# Patient Record
Sex: FEMALE | Race: WHITE | Employment: OTHER | URBAN - NONMETROPOLITAN AREA
[De-identification: names, ages, dates, MRNs, and addresses within clinical notes are randomized per-mention and may not be internally consistent; named-entity substitution may affect disease eponyms.]

---

## 2017-11-03 ENCOUNTER — HOSPITAL ENCOUNTER (OUTPATIENT)
Age: 82
Discharge: HOME | End: 2017-11-03
Payer: MEDICARE

## 2017-11-03 LAB
ABSOLUTE EOS #: 0.08 K/UL (ref 0–0.4)
ABSOLUTE IMMATURE GRANULOCYTE: NORMAL K/UL (ref 0–0.3)
ABSOLUTE LYMPH #: 1.88 K/UL (ref 1–4.8)
ABSOLUTE MONO #: 0.53 K/UL (ref 0–1)
ANION GAP SERPL CALCULATED.3IONS-SCNC: 12 MMOL/L (ref 9–17)
ATYPICAL LYMPHOCYTE ABSOLUTE COUNT: 0.15 K/UL (ref 0–1)
ATYPICAL LYMPHOCYTES: 2 %
BASOPHILS # BLD: NORMAL %
BASOPHILS ABSOLUTE: NORMAL K/UL (ref 0–0.2)
BUN BLDV-MCNC: 23 MG/DL (ref 8–23)
BUN/CREAT BLD: 17 (ref 9–20)
CALCIUM SERPL-MCNC: 9.1 MG/DL (ref 8.6–10.4)
CHLORIDE BLD-SCNC: 105 MMOL/L (ref 98–107)
CO2: 24 MMOL/L (ref 20–31)
CREAT SERPL-MCNC: 1.32 MG/DL (ref 0.5–0.9)
DIFFERENTIAL TYPE: NORMAL
EOSINOPHILS RELATIVE PERCENT: 1 %
GFR AFRICAN AMERICAN: 46 ML/MIN
GFR NON-AFRICAN AMERICAN: 38 ML/MIN
GFR SERPL CREATININE-BSD FRML MDRD: ABNORMAL ML/MIN/{1.73_M2}
GFR SERPL CREATININE-BSD FRML MDRD: ABNORMAL ML/MIN/{1.73_M2}
GLUCOSE BLD-MCNC: 121 MG/DL (ref 70–99)
HCT VFR BLD CALC: 42.1 % (ref 36–46)
HEMOGLOBIN: 13.6 G/DL (ref 12–16)
IMMATURE GRANULOCYTES: NORMAL %
LYMPHOCYTES # BLD: 25 %
MCH RBC QN AUTO: 30.2 PG (ref 26–34)
MCHC RBC AUTO-ENTMCNC: 32.3 G/DL (ref 31–37)
MCV RBC AUTO: 93.4 FL (ref 80–100)
MONOCYTES # BLD: 7 %
MORPHOLOGY: NORMAL
PDW BLD-RTO: 14.8 % (ref 12.1–15.2)
PLATELET # BLD: 210 K/UL (ref 140–450)
PLATELET ESTIMATE: NORMAL
PMV BLD AUTO: NORMAL FL (ref 6–12)
POTASSIUM SERPL-SCNC: 4.3 MMOL/L (ref 3.7–5.3)
RBC # BLD: 4.5 M/UL (ref 4–5.2)
RBC # BLD: NORMAL 10*6/UL
SEG NEUTROPHILS: 65 %
SEGMENTED NEUTROPHILS ABSOLUTE COUNT: 4.86 K/UL (ref 2.5–7)
SODIUM BLD-SCNC: 141 MMOL/L (ref 135–144)
WBC # BLD: 7.5 K/UL (ref 3.5–11)
WBC # BLD: NORMAL 10*3/UL

## 2017-11-03 PROCEDURE — 80048 BASIC METABOLIC PNL TOTAL CA: CPT

## 2017-11-03 PROCEDURE — 36415 COLL VENOUS BLD VENIPUNCTURE: CPT

## 2017-11-03 PROCEDURE — 85025 COMPLETE CBC W/AUTO DIFF WBC: CPT

## 2017-11-09 ENCOUNTER — HOSPITAL ENCOUNTER (OUTPATIENT)
Dept: NON INVASIVE DIAGNOSTICS | Age: 82
Discharge: HOME | End: 2017-11-09
Payer: MEDICARE

## 2017-11-09 PROCEDURE — 93225 XTRNL ECG REC<48 HRS REC: CPT

## 2017-11-09 PROCEDURE — 93227 XTRNL ECG REC<48 HR R&I: CPT | Performed by: INTERNAL MEDICINE

## 2017-11-09 NOTE — PROGRESS NOTES
The patient was educated on the use of a holter monitor. The patient's comprehension was high. The patient was able to verbalize recall. The patient was instructed on how and when to return the monitor. RT educated pt's son of instructions as well at request of pt.     Attila Stewart RCP   11/09/17  9:46 AM

## 2017-11-22 ENCOUNTER — HOSPITAL ENCOUNTER (OUTPATIENT)
Dept: VASCULAR LAB | Age: 82
Discharge: HOME | End: 2017-11-22
Payer: MEDICARE

## 2017-11-22 ENCOUNTER — HOSPITAL ENCOUNTER (OUTPATIENT)
Dept: CT IMAGING | Age: 82
Discharge: HOME | End: 2017-11-22
Payer: MEDICARE

## 2017-11-22 DIAGNOSIS — R27.0 ATAXIA: ICD-10-CM

## 2017-11-22 DIAGNOSIS — R26.81 UNSTEADY GAIT: ICD-10-CM

## 2017-11-22 PROCEDURE — 93880 EXTRACRANIAL BILAT STUDY: CPT

## 2017-11-22 PROCEDURE — 70450 CT HEAD/BRAIN W/O DYE: CPT

## 2018-11-01 ENCOUNTER — HOSPITAL ENCOUNTER (OUTPATIENT)
Dept: MAMMOGRAPHY | Age: 83
Discharge: HOME | End: 2018-11-03
Payer: MEDICARE

## 2018-11-01 ENCOUNTER — HOSPITAL ENCOUNTER (OUTPATIENT)
Age: 83
Discharge: HOME | End: 2018-11-01
Payer: MEDICARE

## 2018-11-01 ENCOUNTER — HOSPITAL ENCOUNTER (OUTPATIENT)
Dept: CT IMAGING | Age: 83
Discharge: HOME | End: 2018-11-03
Payer: MEDICARE

## 2018-11-01 DIAGNOSIS — R63.0 DECREASED APPETITE: ICD-10-CM

## 2018-11-01 DIAGNOSIS — Z12.31 SCREENING MAMMOGRAM, ENCOUNTER FOR: ICD-10-CM

## 2018-11-01 LAB
ABSOLUTE EOS #: 0.12 K/UL (ref 0–0.4)
ABSOLUTE IMMATURE GRANULOCYTE: NORMAL K/UL (ref 0–0.3)
ABSOLUTE LYMPH #: 1.44 K/UL (ref 1–4.8)
ABSOLUTE MONO #: 0.42 K/UL (ref 0–1)
ALBUMIN SERPL-MCNC: 4.1 G/DL (ref 3.5–5.2)
ALBUMIN/GLOBULIN RATIO: ABNORMAL (ref 1–2.5)
ALP BLD-CCNC: 59 U/L (ref 35–104)
ALT SERPL-CCNC: 11 U/L (ref 5–33)
ANION GAP SERPL CALCULATED.3IONS-SCNC: 9 MMOL/L (ref 9–17)
AST SERPL-CCNC: 17 U/L
BASOPHILS # BLD: NORMAL % (ref 0–2)
BASOPHILS ABSOLUTE: NORMAL K/UL (ref 0–0.2)
BILIRUB SERPL-MCNC: 0.55 MG/DL (ref 0.3–1.2)
BUN BLDV-MCNC: 35 MG/DL (ref 8–23)
BUN/CREAT BLD: 22 (ref 9–20)
CA 125: 12 U/ML
CA 19-9: 1 U/ML (ref 0–35)
CALCIUM SERPL-MCNC: 9.3 MG/DL (ref 8.6–10.4)
CARCINOEMBRYONIC ANTIGEN: 3.2 NG/ML
CHLORIDE BLD-SCNC: 106 MMOL/L (ref 98–107)
CO2: 27 MMOL/L (ref 20–31)
CREAT SERPL-MCNC: 1.6 MG/DL (ref 0.5–0.9)
DIFFERENTIAL TYPE: NORMAL
EOSINOPHILS RELATIVE PERCENT: 2 % (ref 0–5)
GFR AFRICAN AMERICAN: 37 ML/MIN
GFR NON-AFRICAN AMERICAN: 30 ML/MIN
GFR SERPL CREATININE-BSD FRML MDRD: ABNORMAL ML/MIN/{1.73_M2}
GFR SERPL CREATININE-BSD FRML MDRD: ABNORMAL ML/MIN/{1.73_M2}
GLUCOSE BLD-MCNC: 102 MG/DL (ref 70–99)
HCT VFR BLD CALC: 41.9 % (ref 36–46)
HEMOGLOBIN: 13.8 G/DL (ref 12–16)
IMMATURE GRANULOCYTES: NORMAL %
LYMPHOCYTES # BLD: 24 % (ref 15–40)
MCH RBC QN AUTO: 30.7 PG (ref 26–34)
MCHC RBC AUTO-ENTMCNC: 32.9 G/DL (ref 31–37)
MCV RBC AUTO: 93.4 FL (ref 80–100)
MONOCYTES # BLD: 7 % (ref 4–8)
MORPHOLOGY: NORMAL
NRBC AUTOMATED: NORMAL PER 100 WBC
PDW BLD-RTO: 13.6 % (ref 12.1–15.2)
PLATELET # BLD: 169 K/UL (ref 140–450)
PLATELET ESTIMATE: NORMAL
PMV BLD AUTO: NORMAL FL (ref 6–12)
POTASSIUM SERPL-SCNC: 4.5 MMOL/L (ref 3.7–5.3)
RBC # BLD: 4.49 M/UL (ref 4–5.2)
RBC # BLD: NORMAL 10*6/UL
SEG NEUTROPHILS: 67 % (ref 47–75)
SEGMENTED NEUTROPHILS ABSOLUTE COUNT: 4.02 K/UL (ref 2.5–7)
SODIUM BLD-SCNC: 142 MMOL/L (ref 135–144)
TOTAL PROTEIN: 7.2 G/DL (ref 6.4–8.3)
TSH SERPL DL<=0.05 MIU/L-ACNC: 2.86 MIU/L (ref 0.3–5)
VITAMIN B-12: 405 PG/ML (ref 232–1245)
WBC # BLD: 6 K/UL (ref 3.5–11)
WBC # BLD: NORMAL 10*3/UL

## 2018-11-01 PROCEDURE — 77067 SCR MAMMO BI INCL CAD: CPT

## 2018-11-01 PROCEDURE — 80053 COMPREHEN METABOLIC PANEL: CPT

## 2018-11-01 PROCEDURE — 74176 CT ABD & PELVIS W/O CONTRAST: CPT

## 2018-11-01 PROCEDURE — 6360000004 HC RX CONTRAST MEDICATION: Performed by: SURGERY

## 2018-11-01 PROCEDURE — 86304 IMMUNOASSAY TUMOR CA 125: CPT

## 2018-11-01 PROCEDURE — 86301 IMMUNOASSAY TUMOR CA 19-9: CPT

## 2018-11-01 PROCEDURE — 82607 VITAMIN B-12: CPT

## 2018-11-01 PROCEDURE — 82378 CARCINOEMBRYONIC ANTIGEN: CPT

## 2018-11-01 PROCEDURE — 36415 COLL VENOUS BLD VENIPUNCTURE: CPT

## 2018-11-01 PROCEDURE — 85025 COMPLETE CBC W/AUTO DIFF WBC: CPT

## 2018-11-01 PROCEDURE — 84443 ASSAY THYROID STIM HORMONE: CPT

## 2018-11-01 RX ADMIN — IOHEXOL 25 ML: 240 INJECTION, SOLUTION INTRATHECAL; INTRAVASCULAR; INTRAVENOUS; ORAL at 13:16

## 2019-08-29 ENCOUNTER — HOSPITAL ENCOUNTER (OUTPATIENT)
Dept: CT IMAGING | Age: 84
Discharge: HOME | End: 2019-08-31
Payer: MEDICARE

## 2019-08-29 DIAGNOSIS — M25.551 HIP PAIN, ACUTE, RIGHT: ICD-10-CM
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
## 2019-08-29 PROCEDURE — 72192 CT PELVIS W/O DYE: CPT

## 2020-11-28 ENCOUNTER — HOSPITAL ENCOUNTER (EMERGENCY)
Age: 85
Discharge: HOME | End: 2020-11-28
Attending: FAMILY MEDICINE
Payer: MEDICARE

## 2020-11-28 ENCOUNTER — APPOINTMENT (OUTPATIENT)
Dept: GENERAL RADIOLOGY | Age: 85
End: 2020-11-28
Payer: MEDICARE

## 2020-11-28 VITALS
HEART RATE: 82 BPM | SYSTOLIC BLOOD PRESSURE: 112 MMHG | DIASTOLIC BLOOD PRESSURE: 76 MMHG | TEMPERATURE: 97.7 F | RESPIRATION RATE: 20 BRPM | OXYGEN SATURATION: 93 % | WEIGHT: 142.7 LBS

## 2020-11-28 LAB
-: ABNORMAL
ABSOLUTE EOS #: 0 K/UL (ref 0–0.4)
ABSOLUTE IMMATURE GRANULOCYTE: ABNORMAL K/UL (ref 0–0.3)
ABSOLUTE LYMPH #: 1.4 K/UL (ref 1–4.8)
ABSOLUTE MONO #: 1.3 K/UL (ref 0–1)
ALBUMIN SERPL-MCNC: 3.7 G/DL (ref 3.5–5.2)
ALBUMIN/GLOBULIN RATIO: ABNORMAL (ref 1–2.5)
ALP BLD-CCNC: 83 U/L (ref 35–104)
ALT SERPL-CCNC: 18 U/L (ref 5–33)
AMORPHOUS: ABNORMAL
ANION GAP SERPL CALCULATED.3IONS-SCNC: 15 MMOL/L (ref 9–17)
AST SERPL-CCNC: 27 U/L
BACTERIA: ABNORMAL
BASOPHILS # BLD: 0 % (ref 0–2)
BASOPHILS ABSOLUTE: 0 K/UL (ref 0–0.2)
BILIRUB SERPL-MCNC: 0.55 MG/DL (ref 0.3–1.2)
BILIRUBIN URINE: NEGATIVE
BUN BLDV-MCNC: 36 MG/DL (ref 8–23)
BUN/CREAT BLD: 17 (ref 9–20)
CALCIUM SERPL-MCNC: 9.6 MG/DL (ref 8.6–10.4)
CASTS UA: ABNORMAL /LPF
CHLORIDE BLD-SCNC: 106 MMOL/L (ref 98–107)
CO2: 22 MMOL/L (ref 20–31)
COLOR: YELLOW
COMMENT UA: ABNORMAL
CREAT SERPL-MCNC: 2.17 MG/DL (ref 0.5–0.9)
CRYSTALS, UA: ABNORMAL /HPF
DIFFERENTIAL TYPE: YES
EOSINOPHILS RELATIVE PERCENT: 0 % (ref 0–5)
EPITHELIAL CELLS UA: ABNORMAL /HPF
GFR AFRICAN AMERICAN: 26 ML/MIN
GFR NON-AFRICAN AMERICAN: 21 ML/MIN
GFR SERPL CREATININE-BSD FRML MDRD: ABNORMAL ML/MIN/{1.73_M2}
GFR SERPL CREATININE-BSD FRML MDRD: ABNORMAL ML/MIN/{1.73_M2}
GLUCOSE BLD-MCNC: 178 MG/DL (ref 70–99)
GLUCOSE URINE: NEGATIVE
HCT VFR BLD CALC: 41 % (ref 36–46)
HEMOGLOBIN: 13.6 G/DL (ref 12–16)
IMMATURE GRANULOCYTES: ABNORMAL %
KETONES, URINE: NEGATIVE
LEUKOCYTE ESTERASE, URINE: NEGATIVE
LYMPHOCYTES # BLD: 12 % (ref 15–40)
MCH RBC QN AUTO: 30.9 PG (ref 26–34)
MCHC RBC AUTO-ENTMCNC: 33.2 G/DL (ref 31–37)
MCV RBC AUTO: 93.2 FL (ref 80–100)
MONOCYTES # BLD: 11 % (ref 4–8)
MUCUS: ABNORMAL
NITRITE, URINE: POSITIVE
NRBC AUTOMATED: ABNORMAL PER 100 WBC
OTHER OBSERVATIONS UA: ABNORMAL
PDW BLD-RTO: 13.9 % (ref 12.1–15.2)
PH UA: 6 (ref 5–8)
PLATELET # BLD: 134 K/UL (ref 140–450)
PLATELET ESTIMATE: ABNORMAL
PMV BLD AUTO: ABNORMAL FL (ref 6–12)
POTASSIUM SERPL-SCNC: 4.4 MMOL/L (ref 3.7–5.3)
PROTEIN UA: ABNORMAL
RBC # BLD: 4.4 M/UL (ref 4–5.2)
RBC # BLD: ABNORMAL 10*6/UL
RBC UA: ABNORMAL /HPF (ref 0–2)
RENAL EPITHELIAL, UA: ABNORMAL /HPF
SEG NEUTROPHILS: 77 % (ref 47–75)
SEGMENTED NEUTROPHILS ABSOLUTE COUNT: 9.2 K/UL (ref 2.5–7)
SODIUM BLD-SCNC: 143 MMOL/L (ref 135–144)
SPECIFIC GRAVITY UA: 1.02 (ref 1–1.03)
TOTAL PROTEIN: 7.1 G/DL (ref 6.4–8.3)
TRICHOMONAS: ABNORMAL
TURBIDITY: ABNORMAL
URINE HGB: ABNORMAL
UROBILINOGEN, URINE: NORMAL
WBC # BLD: 11.8 K/UL (ref 3.5–11)
WBC # BLD: ABNORMAL 10*3/UL
WBC UA: ABNORMAL /HPF
YEAST: ABNORMAL

## 2020-11-28 PROCEDURE — 81001 URINALYSIS AUTO W/SCOPE: CPT

## 2020-11-28 PROCEDURE — 80053 COMPREHEN METABOLIC PANEL: CPT

## 2020-11-28 PROCEDURE — 99284 EMERGENCY DEPT VISIT MOD MDM: CPT

## 2020-11-28 PROCEDURE — 87186 SC STD MICRODIL/AGAR DIL: CPT

## 2020-11-28 PROCEDURE — 85025 COMPLETE CBC W/AUTO DIFF WBC: CPT

## 2020-11-28 PROCEDURE — 6370000000 HC RX 637 (ALT 250 FOR IP): Performed by: FAMILY MEDICINE

## 2020-11-28 PROCEDURE — 87086 URINE CULTURE/COLONY COUNT: CPT

## 2020-11-28 PROCEDURE — 87088 URINE BACTERIA CULTURE: CPT

## 2020-11-28 PROCEDURE — 2580000003 HC RX 258: Performed by: FAMILY MEDICINE

## 2020-11-28 PROCEDURE — 72170 X-RAY EXAM OF PELVIS: CPT

## 2020-11-28 RX ORDER — NITROFURANTOIN 25; 75 MG/1; MG/1
100 CAPSULE ORAL 2 TIMES DAILY
Qty: 20 CAPSULE | Refills: 0 | Status: SHIPPED | OUTPATIENT
Start: 2020-11-28 | End: 2020-12-08

## 2020-11-28 RX ORDER — SODIUM CHLORIDE 9 MG/ML
INJECTION, SOLUTION INTRAVENOUS ONCE
Status: DISCONTINUED | OUTPATIENT
Start: 2020-11-28 | End: 2020-11-28 | Stop reason: HOSPADM

## 2020-11-28 RX ORDER — 0.9 % SODIUM CHLORIDE 0.9 %
250 INTRAVENOUS SOLUTION INTRAVENOUS ONCE
Status: COMPLETED | OUTPATIENT
Start: 2020-11-28 | End: 2020-11-28

## 2020-11-28 RX ORDER — IBUPROFEN 200 MG
200 TABLET ORAL EVERY 8 HOURS PRN
COMMUNITY

## 2020-11-28 RX ORDER — NITROFURANTOIN 25; 75 MG/1; MG/1
100 CAPSULE ORAL ONCE
Status: COMPLETED | OUTPATIENT
Start: 2020-11-28 | End: 2020-11-28

## 2020-11-28 RX ORDER — ACETAMINOPHEN 325 MG/1
650 TABLET ORAL EVERY 6 HOURS PRN
COMMUNITY

## 2020-11-28 RX ADMIN — BISACODYL 10 MG: 5 TABLET, COATED ORAL at 12:51

## 2020-11-28 RX ADMIN — SODIUM CHLORIDE 250 ML: 9 INJECTION, SOLUTION INTRAVENOUS at 12:12

## 2020-11-28 RX ADMIN — NITROFURANTOIN MONOHYDRATE/MACROCRYSTALLINE 100 MG: 25; 75 CAPSULE ORAL at 12:51

## 2020-11-28 ASSESSMENT — PAIN DESCRIPTION - DESCRIPTORS: DESCRIPTORS: ACHING

## 2020-11-28 ASSESSMENT — PAIN DESCRIPTION - FREQUENCY: FREQUENCY: CONTINUOUS

## 2020-11-28 ASSESSMENT — PAIN DESCRIPTION - PAIN TYPE: TYPE: ACUTE PAIN

## 2020-11-28 ASSESSMENT — PAIN DESCRIPTION - ORIENTATION: ORIENTATION: RIGHT

## 2020-11-28 ASSESSMENT — PAIN SCALES - WONG BAKER: WONGBAKER_NUMERICALRESPONSE: 4

## 2020-11-28 ASSESSMENT — PAIN DESCRIPTION - LOCATION: LOCATION: HIP

## 2020-11-28 ASSESSMENT — PAIN SCALES - GENERAL: PAINLEVEL_OUTOF10: 4

## 2020-11-28 NOTE — PROGRESS NOTES
Report called to nurse Arina at the [facility]. Jimmie Alvarez made aware of UTI and new Rx for Macrobid.

## 2020-11-28 NOTE — ED PROVIDER NOTES
[facility] ED  EMERGENCY DEPARTMENT ENCOUNTER      Pt Name: Norma Daniels  MRN: [id number]  [place] 7/9/1928  Date of evaluation: 11/28/2020  Provider: Ej Abebe MD    CHIEF COMPLAINT       Chief Complaint   Patient presents with    Fall     fell today and has right hip pain - sent by [facility]          HISTORY OF PRESENT ILLNESS   (Location/Symptom, Timing/Onset, Context/Setting, Quality, Duration, Modifying Factors, Severity)  Note limiting factors. Norma Daniels is a 80 y.o. female who presents to the emergency department very hard of hearing demented nursing home patient who is a DNR CC. She apparently had a fall yesterday and staff felt like she was having pain in her right hip low she was ambulatory per squad. No head injury. No further report. Patient cannot give me any history at all. HPI    Nursing Notes were reviewed. REVIEW OF SYSTEMS    (2-9 systems for level 4, 10 or more for level 5)     Review of Systems   Constitutional:        Unable to obtain secondary to advanced dementia       Except as noted above the remainder of the review of systems was reviewed and negative. PAST MEDICAL HISTORY     Past Medical History:   Diagnosis Date    Dementia ([facility] [address]     Diabetes mellitus ([facility] [facility]     Hypertension     Kidney disease          SURGICAL HISTORY     History reviewed. No pertinent surgical history. CURRENT MEDICATIONS       Previous Medications    ACETAMINOPHEN (TYLENOL) 325 MG TABLET    Take 650 mg by mouth every 6 hours as needed for Pain    IBUPROFEN (ADVIL;MOTRIN) 200 MG TABLET    Take 200 mg by mouth every 8 hours as needed for Pain       ALLERGIES     Patient has no known allergies. FAMILY HISTORY     History reviewed. No pertinent family history. SOCIAL HISTORY       Social History     Socioeconomic History    Marital status:       Spouse name: None    Number of children: None    Years of education: None    Highest education level: None Occupational History    None   Social Needs    Financial resource strain: None    Food insecurity     Worry: None     Inability: None    Transportation needs     Medical: None     Non-medical: None   Tobacco Use    Smoking status: Never Smoker    Smokeless tobacco: Never Used   Substance and Sexual Activity    Alcohol use: Not Currently    Drug use: Never    Sexual activity: None   Lifestyle    Physical activity     Days per week: None     Minutes per session: None    Stress: None   Relationships    Social connections     Talks on phone: None     Gets together: None     Attends Ep[employer]opalian service: None     Active member of club or organization: None     Attends meetings of clubs or organizations: None     Relationship status: None    Intimate partner violence     Fear of current or ex partner: None     Emotionally abused: None     Physically abused: None     Forced sexual activity: None   Other Topics Concern    None   Social History Narrative    None       SCREENINGS        [place] Coma Scale  Eye Opening: Spontaneous  Best Verbal Response: Confused  Best Motor Response: Obeys commands  [place] Coma Scale Score: 14               PHYSICAL EXAM    (up to 7 for level 4, 8 or more for level 5)     ED Triage Vitals   BP Temp Temp Source Pulse Resp SpO2 Height Weight   11/28/20 1159 11/28/20 1155 11/28/20 1155 11/28/20 1202 11/28/20 1155 11/28/20 1210 -- 11/28/20 1155   112/76 97.7 °F (36.5 °C) Oral 82 20 93 %  142 lb 11.2 oz (64.7 kg)       Physical Exam  Vitals signs reviewed. Constitutional:       Appearance: She is not ill-appearing. HENT:      Head: Atraumatic. Nose: No congestion. Mouth/Throat:      Mouth: Mucous membranes are moist.      Pharynx: No posterior oropharyngeal erythema. Eyes:      General:         Right eye: No discharge. Left eye: No discharge. Pupils: Pupils are equal, round, and reactive to light. Neck:      Musculoskeletal: Neck supple.  No muscular FOR LOW K - Abnormal; Notable for the following components:    Glucose 178 (*)     BUN 36 (*)     CREATININE 2.17 (*)     GFR Non- 21 (*)     GFR  26 (*)     All other components within normal limits   URINALYSIS - Abnormal; Notable for the following components:    Turbidity UA CLOUDY (*)     Urine Hgb 2+ (*)     Protein, UA 1+ (*)     Nitrite, Urine POSITIVE (*)     All other components within normal limits   MICROSCOPIC URINALYSIS - Abnormal; Notable for the following components:    Bacteria, UA 3+ (*)     All other components within normal limits   CULTURE, URINE       All other labs were within normal range or not returned as of this dictation. EMERGENCY DEPARTMENT COURSE and DIFFERENTIAL DIAGNOSIS/MDM:   Vitals:    Vitals:    11/28/20 1155 11/28/20 1159 11/28/20 1202 11/28/20 1210   BP:  112/76     Pulse:   82    Resp: 20      Temp: 97.7 °F (36.5 °C)      TempSrc: Oral      SpO2:    93%   Weight: 142 lb 11.2 oz (64.7 kg)              MDM      REASSESSMENT          CRITICAL CARE TIME       CONSULTS:  None    PROCEDURES:  Unless otherwise noted below, none     Procedures      FINAL IMPRESSION      1. General weakness    2. Chronic renal failure, stage 4 (severe) (HCC)    3. Acute cystitis with hematuria          DISPOSITION/PLAN   DISPOSITION Decision To Discharge 11/28/2020 12:43:04 PM      PATIENT REFERRED TO:  Chintan Leo MD  [address] [place] [address]    In 1 week  If symptoms worsen      DISCHARGE MEDICATIONS:  New Prescriptions    NITROFURANTOIN, MACROCRYSTAL-MONOHYDRATE, (MACROBID) 100 MG CAPSULE    Take 1 capsule by mouth 2 times daily for 10 days     Controlled Substances Monitoring:     No flowsheet data found.     (Please note that portions of this note were completed with a voice recognition program.  Efforts were made to edit the dictations but occasionally words are mis-transcribed.)    Rima Frye MD (electronically signed)  Attending Emergency Physician            Danielle Wade MD  11/28/20 [phone]

## 2020-11-29 ENCOUNTER — HOSPITAL ENCOUNTER (EMERGENCY)
Age: 85
Discharge: HOME | End: 2020-11-29
Attending: FAMILY MEDICINE
Payer: MEDICARE

## 2020-11-29 ENCOUNTER — APPOINTMENT (OUTPATIENT)
Dept: CT IMAGING | Age: 85
End: 2020-11-29
Payer: MEDICARE

## 2020-11-29 VITALS
SYSTOLIC BLOOD PRESSURE: 129 MMHG | HEART RATE: 84 BPM | DIASTOLIC BLOOD PRESSURE: 81 MMHG | OXYGEN SATURATION: 92 % | RESPIRATION RATE: 18 BRPM

## 2020-11-29 PROCEDURE — 70450 CT HEAD/BRAIN W/O DYE: CPT

## 2020-11-29 PROCEDURE — 99283 EMERGENCY DEPT VISIT LOW MDM: CPT

## 2020-11-29 PROCEDURE — 72125 CT NECK SPINE W/O DYE: CPT

## 2020-11-29 NOTE — ED NOTES
Pt placed in C-collar by Dr. July Evans RN, and Nayely Espinoza RN.       Torsten Andrade, RN  11/29/20 3213

## 2020-11-29 NOTE — ED PROVIDER NOTES
[facility] ED  EMERGENCY DEPARTMENT ENCOUNTER      Pt Name: Gerson Ac  MRN: [id number]  [place] 7/9/1928  Date of evaluation: 11/29/2020  Provider: Shania Wolfe MD    CHIEF COMPLAINT       Chief Complaint   Patient presents with   Lester Olson     pt had unwitnessed fall at the nursing facility. the states she was found down on her right side, lots of dried blood to the right side of her face. HISTORY OF PRESENT ILLNESS   (Location/Symptom, Timing/Onset, Context/Setting, Quality, Duration, Modifying Factors, Severity)  Note limiting factors. Gerson Ac is a 80 y.o. female who presents to the emergency department apparent fall. I just saw her yesterday for a fall, she was complaining of some hip pain, pelvic film was normal.  Lab work-up negative except for underlying urinary tract infection, patient was started on Macrobid. I did not have any history from the fall today but she clearly hit her nose has bleeding from the right nostril covering the right side of her face. HPI    Nursing Notes were reviewed. REVIEW OF SYSTEMS    (2-9 systems for level 4, 10 or more for level 5)     Review of Systems   Constitutional:        Unable to obtain as patient has advanced dementia       Except as noted above the remainder of the review of systems was reviewed and negative. PAST MEDICAL HISTORY     Past Medical History:   Diagnosis Date    Dementia ([address]     Diabetes mellitus ([facility] [address]     Hypertension     Kidney disease          SURGICAL HISTORY     No past surgical history on file.       CURRENT MEDICATIONS       Previous Medications    ACETAMINOPHEN (TYLENOL) 325 MG TABLET    Take 650 mg by mouth every 6 hours as needed for Pain    IBUPROFEN (ADVIL;MOTRIN) 200 MG TABLET    Take 200 mg by mouth every 8 hours as needed for Pain    NITROFURANTOIN, MACROCRYSTAL-MONOHYDRATE, (MACROBID) 100 MG CAPSULE    Take 1 capsule by mouth 2 times daily for 10 days       ALLERGIES     Patient has no known allergies. FAMILY HISTORY     No family history on file. SOCIAL HISTORY       Social History     Socioeconomic History    Marital status:      Spouse name: Not on file    Number of children: Not on file    Years of education: Not on file    Highest education level: Not on file   Occupational History    Not on file   Social Needs    Financial resource strain: Not on file    Food insecurity     Worry: Not on file     Inability: Not on file    Transportation needs     Medical: Not on file     Non-medical: Not on file   Tobacco Use    Smoking status: Never Smoker    Smokeless tobacco: Never Used   Substance and Sexual Activity    Alcohol use: Not Currently    Drug use: Never    Sexual activity: Not on file   Lifestyle    Physical activity     Days per week: Not on file     Minutes per session: Not on file    Stress: Not on file   Relationships    Social connections     Talks on phone: Not on file     Gets together: Not on file     Attends Jainism service: Not on file     Active member of club or organization: Not on file     Attends meetings of clubs or organizations: Not on file     Relationship status: Not on file    Intimate partner violence     Fear of current or ex partner: Not on file     Emotionally abused: Not on file     Physically abused: Not on file     Forced sexual activity: Not on file   Other Topics Concern    Not on file   Social History Narrative    Not on file       SCREENINGS        Bon Coma Scale  Eye Opening: To speech  Best Verbal Response: Confused  Best Motor Response: Localizes pain  [place] Coma Scale Score: 12               PHYSICAL EXAM    (up to 7 for level 4, 8 or more for level 5)     ED Triage Vitals   BP Temp Temp src Pulse Resp SpO2 Height Weight   -- -- -- -- -- -- -- --       Physical Exam  Constitutional:       Comments: Bleeding from the right nare has stopped, blood covering the right side of her face.   I did not detect any midline cervical discomfort she can move all four extremities with range of motion by me. She complains of a little bit of discomfort when I fully flex the right hip. Pelvis feels stable   HENT:      Nose:      Comments: Controlled epistaxis  Cardiovascular:      Rate and Rhythm: Normal rate and regular rhythm. Pulmonary:      Effort: Pulmonary effort is normal.      Breath sounds: Normal breath sounds. Abdominal:      Palpations: Abdomen is soft. Tenderness: There is no abdominal tenderness. DIAGNOSTIC RESULTS     EKG: All EKG's are interpreted by the Emergency Department Physician who either signs or Co-signs this chart in the absence of a cardiologist.        RADIOLOGY:   Non-plain film images such as CT, Ultrasound and MRI are read by the radiologist. Plain radiographic images are visualized and preliminarily interpreted by the emergency physician with the below findings:        Interpretation per the Radiologist below, if available at the time of this note:    CT Head WO Contrast   Final Result      No acute intracranial abnormality. Severe brain atrophy redemonstrated. CT Cervical Spine WO Contrast   Final Result      1. No traumatic spondylolisthesis or fracture in the cervical spine. 2. Multilevel degenerative disc disease and facet arthrosis. No significant    central or foraminal stenosis. ED BEDSIDE ULTRASOUND:   Performed by ED Physician - none    LABS:  Labs Reviewed - No data to display    All other labs were within normal range or not returned as of this dictation. EMERGENCY DEPARTMENT COURSE and DIFFERENTIAL DIAGNOSIS/MDM:   Vitals:    Vitals:    11/29/20 0617 11/29/20 0649 11/29/20 0701 11/29/20 0716   BP: 120/75 111/74 108/74 110/73   Pulse: 95 93 86 90   Resp: (!) 31 26 26 (!) 32   SpO2:  (!) 23%  (!) 78%           MDM  Number of Diagnoses or Management Options  Diagnosis management comments:  This is the patient's second fall in 2 days, she has advanced dementia. CT head and neck were negative, she did have epistaxis which is controlled. No other obvious skin lesions. Full range of motion of all extremities. At this point she will be discharged back to the nursing facility        [address] [place] [address]       CONSULTS:  None    PROCEDURES:  Unless otherwise noted below, none     Procedures      FINAL IMPRESSION      1. Fall from bed, initial encounter Inadequately Controlled   2. Epistaxis Controlled         DISPOSITION/PLAN   DISPOSITION        PATIENT REFERRED TO:  No follow-up provider specified. DISCHARGE MEDICATIONS:  New Prescriptions    No medications on file     Controlled Substances Monitoring:     No flowsheet data found.     (Please note that portions of this note were completed with a voice recognition program.  Efforts were made to edit the dictations but occasionally words are mis-transcribed.)    Patricia Todd MD (electronically signed)  Attending Emergency Physician                Patricia Todd MD  11/29/20 2031

## 2020-11-29 NOTE — ED NOTES
Writer cleans dried blood from pt's right side of face. No lacerations noted. It looks as though the bleeding was from her right nostril.      Corbin Ellis RN  11/29/20 5918

## 2020-11-29 NOTE — PROGRESS NOTES
Report called to nurse [facility] at the [facility]. Made aware that pt is departing at this time and will be returning to the [facility].

## 2020-11-29 NOTE — ED NOTES
Pt is placed on 5L nasal cannula for low O2 sats in the 60s. Pt is now sating 96%.       Ruth Quintanilla, LIZZ  11/29/20 8257

## 2020-11-30 LAB
CULTURE: ABNORMAL
Lab: ABNORMAL
SPECIMEN DESCRIPTION: ABNORMAL